# Patient Record
Sex: FEMALE | Race: ASIAN | NOT HISPANIC OR LATINO | URBAN - METROPOLITAN AREA
[De-identification: names, ages, dates, MRNs, and addresses within clinical notes are randomized per-mention and may not be internally consistent; named-entity substitution may affect disease eponyms.]

---

## 2021-04-13 ENCOUNTER — APPOINTMENT (RX ONLY)
Dept: URBAN - METROPOLITAN AREA CLINIC 12 | Facility: CLINIC | Age: 26
Setting detail: DERMATOLOGY
End: 2021-04-13

## 2021-04-13 DIAGNOSIS — L91.0 HYPERTROPHIC SCAR: ICD-10-CM

## 2021-04-13 PROCEDURE — ? TREATMENT REGIMEN

## 2021-04-13 PROCEDURE — 99202 OFFICE O/P NEW SF 15 MIN: CPT

## 2021-04-13 PROCEDURE — ? DIAGNOSIS COMMENT

## 2021-04-13 PROCEDURE — ? COUNSELING

## 2021-04-13 ASSESSMENT — LOCATION DETAILED DESCRIPTION DERM: LOCATION DETAILED: SUPERIOR MID FOREHEAD

## 2021-04-13 ASSESSMENT — LOCATION SIMPLE DESCRIPTION DERM: LOCATION SIMPLE: SUPERIOR FOREHEAD

## 2021-04-13 ASSESSMENT — LOCATION ZONE DERM: LOCATION ZONE: FACE

## 2021-04-13 NOTE — PROCEDURE: TREATMENT REGIMEN
Plan: consider tretinoin, [place] TAC (tho minimally raised at this time), microneedling, laser.
Otc Regimen: Silicone Gel Sheet Nightly to scar.  \\nSunscreen 30+ daily (zinc)\\nGentle but firm pressure to massage.
Detail Level: Zone

## 2021-04-13 NOTE — PROCEDURE: DIAGNOSIS COMMENT
Detail Level: Simple
Comment: 2/2 to wart removal product used on face about 5 months ago.
Render Risk Assessment In Note?: no